# Patient Record
Sex: FEMALE | Race: WHITE | Employment: FULL TIME | URBAN - METROPOLITAN AREA
[De-identification: names, ages, dates, MRNs, and addresses within clinical notes are randomized per-mention and may not be internally consistent; named-entity substitution may affect disease eponyms.]

---

## 2018-09-21 PROBLEM — D25.2 SUBSEROUS LEIOMYOMA OF UTERUS: Chronic | Status: ACTIVE | Noted: 2018-09-21

## 2020-01-24 ENCOUNTER — HOSPITAL ENCOUNTER (OUTPATIENT)
Dept: PHYSICAL THERAPY | Age: 30
End: 2020-01-24

## 2020-01-31 ENCOUNTER — HOSPITAL ENCOUNTER (OUTPATIENT)
Dept: PHYSICAL THERAPY | Age: 30
Discharge: HOME | End: 2020-01-31
Payer: COMMERCIAL

## 2020-01-31 PROCEDURE — 92522 EVALUATE SPEECH PRODUCTION: CPT

## 2020-01-31 PROCEDURE — 92507 TX SP LANG VOICE COMM INDIV: CPT

## 2020-01-31 NOTE — PROGRESS NOTES
[facility]  [address] [facility], [address] [facility] [address]  Ph [phone]  Fx [phone]      Plan of Care/ Statement of Necessity for Speech Therapy Services    Patient name: Javier Rodríguez Start of Care: 2020   Referral source: Samuel Cortez MD : 1990    Medical Diagnosis: Hoarseness [R49.0]   Onset Date:2019    Treatment Diagnosis: Dysphonia [R49.0]   Prior Hospitalization: see medical history Provider#: [id number]   Medications: Verified on Patient summary List    Comorbidities: GERD, Left Tonsillar Hypertrophy   Prior Level of Function: Normal vocal quality prior to GERD diagnosis       The Plan of Care and following information is based on the information from the initial evaluation. Assessment/ key information:   Pt is a 79-year-old female who was referred for an OP speech and language evaluation due to changes in vocal quality. Pt began experiencing symptoms of GERD in . Early in the diagnosis, Pt was prescribed a PPI, but reports no reduction in symptoms. Currently, GERD is not controlled by medication, however, Pt has made lifestyle changes to include elimination of acid-causing foods and caffeine, and waiting 3-hours after eating to lie down. Pt's current symptoms include pain in throat with speech, mild hoarseness, especially with excessive talking, strain and tightness in the laryngeal area, and occasional mucous build up in throat. She works as an  and often engages in meetings with clients and presentations with colleagues; She reports some impact on job due to pain and tightness with speech. No difficulty swallowing reported.     Perceptual assessment:  Consensus Auditory Perceptual Evaluation of Voice (CAPE-V) was administered by this SLP :On a scale of mild to severe, attained the following scores: (see attached in paper chart):  Overall Severity: Consistent; Mildly deviant, Roughness: Inconsistent; Mildly-deviant; Breathiness: Inconsistent; Mildly-deviant, Strain: Consistent; Mild-to-moderately deviant, Pitch (reduced pitch range when singing); Consistent; Mildly deviant, Loudness: No impact; Hoarseness; Inconsistent; Mild-to-Moderately deviant. Patient completed Voice Handicap Index (VHI): (see attached in her paper chart):Zero indicates 'never handicapping' and 4 indicating 'always handicapping'. The higher the score the more handicapping. There are 40 points possible for each of the 3 sections and also a total handicapping score of 120 points. For functional she attained a score of 0 points; for physical she scored 7 points; and for emotional she scored 3 points. She attained a total score of 10 points (Minimal handicap). She indicated that she notices worsening vocal quality at the end of the day, which leads to increased strain for voicing. Pt max phonation time was 18-seconds with an average of 17-seconds. She was able to sustain phonation with an average of 77-dB. S/Z ration was found to be 1.08, which is indicative of positive vocal fold vibration during phonation. Clavicular breathing observed during max phonation tasks and s/z ration task, however, abdominal breathing observed during connected speech. She reported feeling out of breath during connected speech (oral reading. It is recommended that Pt receive skilled voice therapy at a therapy schedule of 1x weekly for 1 month. 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 Pt will benefit from exercises to reduce laryngeal tension for improved vocal quality and ease of phonation. A range of exercises will be trialed at the next session to determine the best course of action.     Problem List:      []aphasic  []dysarthric  []dysphagic       []alexic  []agraphic  [x]dysphonia       []dysfluency   []Cognitive-Linguistic Disorder       []other   Treatment Plan may include any combination of the following: Voice Treatment and Patient Education      Patient / Family readiness to learn indicated by: asking questions and interest    Persons(s) to be included in education:   patient (P)    Barriers to Learning/Limitations: None  Measures Taken If Barriers to Learning Indicated: N/A    Patient Goal (s): Determine if throat pain is from using voice wrong or if it's just from stomach acid    Patient Self Reported Health Status: good    Rehabilitation Potential: good    Short Term Goals: To be accomplished in 5 weeks (Goals to be updated after trial)  1) Pt will participate in trial of vocal exercise programs for Muscle Tension Dysphonia to establish the best coarse of action within 1 session. Long Term Goals: To be accomplished in 8 weeks   1) Pt will demonstrate improved vocal quality, respiratory/phonatory control, and intensity at conversational level to improve daily voice for interactions with clients and colleagues. Frequency / Duration: Patient to be seen 1 times per week for 5 weeks:  Certification Period: N/A  Patient/ Caregiver education and instruction: Diagnosis, prognosis, vocal hygeine, muscle tension dysphonia education    Thank you for this referral!    Babs Mcwilliams SLP 1/31/2020 10:10 AM  ________________________________________________________________________    I certify that the above Therapy Services are being furnished while the patient is under my care. I agree with the treatment plan and certify that this therapy is necessary.     Physician's Signature:____________Date:_________TIME:________    [employer], Date and Time must be completed for valid certification **    Please sign and return to [facility]  [address] Balbir Da Silva, [facility]  Ph [phone]  Fx [phone]       Thank you

## 2020-01-31 NOTE — PROGRESS NOTES
[address] [facility] EVALUATION    Patient Name: Art Mccann  ULCP:  : 1990  [x]  Patient  Verified  Payor: Jose Alberto Parks / Plan: [employer] [place] [employer] / Product Type: HMO /   In time:11:45  Out time:12:20  Total Treatment Time (min): 35  Visit #: 1 of 5    SUBJECTIVE  Pain Level (0-10 scale): 0  Any medication changes, allergies to medications, adverse drug reactions, diagnosis change, or new procedure performed?: [x] No    [] Yes (see summary sheet for update)  Subjective functional status/changes:   [] No changes reported  Determine if throat pain is from using voice wrong or if it's just from stomach acid  Date of Onset: 2019  Social History: Works as an   Prior Functional level: Normal vocal quality  Radiology: N/A  OBJECTIVE    OUTPATIENT VOICE EVALUATION    Description of Problem: Hoarseness, throat pain when speaking, tightness in laryngeal area    Onset of Problem: 2019  Variability through Day: Becomes worse in the evening  Voice Usage: Moderate (on job with clients and colleagues)    Know Abuse/Misuse: Throat clearing    Pitch:   [x] WNL  [] Low  [] High     Comments:  Loudness: [x] WNL  [] Excessive [] Inadequate     Comments:  Quality:  [] WNL      Comments: Mild hoarseness  Resonance: [x] WNL  [] Hypernasal [] Hyponasal     Comments:  Rate:  [x] WNL  [] Fast  [] Slow     Comments:  Range:  [x] WNL  [] [place] [] Excessive variability    Comments:  Observations [] Pitch Breaks [x] Glottal Love [] Stridency [] Hard Glottal Attacks    [] Aphonia [] Diplophonia [] Phonation Breaks     [] Severe Fluctuations on Quality    []Other:     [] Aphonia interspersed with intermittent phonation      Vowel prolongation /a/ (a measure of respiratory/phonatory efficiency):  Duration: 18 seconds max phonation time, Intensity:  77 dB   Vocal quality: Mild hoarseness                S/Z Ratio: S=14, Z=13   1.08    Is breathing audible?   [] Yes [x] No  Is phonation on inhalation? [] Yes [x] No  Is breathing pattern irregular? [] Yes [x] No  Does patient have difficulty sustaining expiration? [] Yes [x] No  Does patient focus on breathing? [] Yes [x] No  Clavicular breathing? [x] Yes [] No  Reduced respiratory/speech coordination? [x] Yes [] No    Frequent coughing? [x] Yes [] No   Frequent throat clearing? [] Yes [x] No   Extrinsic laryngeal tension? [x] Yes [] No  Visible tension present: [x]neck  [] chest  [] mandible  Evidence of tight throat during phonation? [x] Yes [] No  Complaints of tired throat? [x] Yes [] No  Tone focus: []retracted tongue  [] closed mouth   []appropriate [x] back throat focus    Perceptual assessment:  Consensus Auditory Perceptual Evaluation of Voice (CAPE-V) was administered by this SLP :On a scale of mild to severe, attained the following scores: (see attached in paper chart):  Overall Severity: Consistent; Mildly deviant, Roughness: Inconsistent; Mildly-deviant; Breathiness: Inconsistent; Mildly-deviant, Strain: Consistent; Mild-to-moderately deviant, Pitch (reduced pitch range when singing); Consistent; Mildly deviant, Loudness: No impact; Hoarseness; Inconsistent; Mild-to-Moderately deviant.      Patient completed Voice Handicap Index (VHI): (see attached in her paper chart):Zero indicates 'never handicapping' and 4 indicating 'always handicapping'. The higher the score the more handicapping. There are 40 points possible for each of the 3 sections and also a total handicapping score of 120 points. For functional she attained a score of 0 points; for physical she scored 7 points; and for emotional she scored 3 points. She attained a total score of 10 points (Minimal handicap). She indicated that she notices worsening vocal quality at the end of the day, which leads to increased strain for voicing.     Pt max phonation time was 18-seconds with an average of 17-seconds. She was able to sustain phonation with an average of 77-dB.  S/Z ration was found to be 1.08, which is indicative of positive vocal fold vibration during phonation. Clavicular breathing observed during max phonation tasks and s/z ration task, however, abdominal breathing observed during connected speech. She reported feeling out of breath during connected speech (oral reading. It is recommended that Pt receive skilled voice therapy at a therapy schedule of 1x weekly for 1 month. 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 Pt will benefit from exercises to reduce laryngeal tension for improved vocal quality and ease of phonation. A range of exercises will be trialed at the next session to determine the best course of action. Speech:   Oral Verbal Apraxia: [x] None     [] Mild [] Moderate [] Severe   Dysarthria:  [x] None     [] Mild [] Moderate [] Severe   Intelligibility  [x] WNL      [] Words [] Phrases [] Sentences       [] Conversation  % intelligible:   Agrammatic:  [] Yes       [x] No  Hearing screened by:    Passed [] Yes [] No  Comments: Not assessed  Fluency:  [x]WNL  []Dysfluent   Comments:  Motor Speech:  [x]Articulation [facility]    []Apraxia  []oral []verbal  ( []min []mod []severe)    []Dysarthria    Intelligibility:  Deviations noted:  [x]none  []yes, describe:   Auditory Comprehension: [x] WFL [] Impaired - describe: (subjective)  Verbal Expression: [x] WFL [] Impaired - describe: (subjective)  Reading comprehension: [x] WFL [] Impaired - describe: (subjective)  Cognitive skills: [x] WFL [] Impaired - describe: (subjective)      Patient/Caregiver instruction/education: [x] Review HEP    [] Progressed/Changed    HEP/Handouts given: Vocal hygiene handout to be reviewed and implemented     Pain Level (0-10 scale) post treatment: 0    ASSESSMENT  [x]  See Plan of Care     PLAN  []  Upgrade activities as tolerated     [x]  Continue plan of care  []  Discharge due to:__  [] Other:__    Verónica Quesada IAN Tracy 1/31/2020  10:16 AM

## 2020-02-14 ENCOUNTER — HOSPITAL ENCOUNTER (OUTPATIENT)
Dept: PHYSICAL THERAPY | Age: 30
Discharge: HOME | End: 2020-02-14
Payer: COMMERCIAL

## 2020-02-14 PROCEDURE — 92507 TX SP LANG VOICE COMM INDIV: CPT

## 2020-02-14 NOTE — PROGRESS NOTES
ST DAILY TREATMENT NOTE    Patient Name: Olga Lidia Hernandez  JEBA:  : 1990  [x]  Patient  Verified  Payor: Payor: Ramirez Medico / Plan: [address] PT / Product Type: Commerical /   In time:12:30  Out time:1:15  Total Treatment Time (min): 45  Visit #: 2 of 5    Treatment Diagnosis: Dysphonia [R49.0]    SUBJECTIVE  Pain Level (0-10 scale): 4  Any medication changes, allergies to medications, adverse drug reactions, diagnosis change, or new procedure performed?: [x] No    [] Yes (see summary sheet for update)    Subjective functional status/changes:   [x] No changes reported  \"Still some pain on the right side of my throat when I speak, but less hoarseness. \"    OBJECTIVE  Treatment provided includes:  Increase/Improve:  [x]  Voice Quality []  Cognitive Linguistic Skills []  Laryngeal/Pharyngeal Exercises   []  Vocal Loudness []  Reading Comprehension []  Swallowing Skills    [x]  Vocal Cord Function []  Auditory Comprehension []  Oral Motor Skills   []  Resonance []  Writing Skills []  Compensatory strategies    []  Speech Intelligibility []  Expressive Language []  Attention   []  Breath Support/Coord. []  Receptive language []  Memory   []  Articulation []  Safety Awareness []    []  Fluency []  Word Retrieval []        Treatment Provided:  Vocal Function Exercises introduced this date:    Pt sustained \"ee\" with an average of 14 seconds x5    Stretch exercise performed with no voice breaks x5    Contraction exercises performed with no voice breaks x5    Power exercises:  C4: 18s  D4: 13s  E4:14s  F4:15s  G412s    SOVT introduced and performed without difficulty in water    Patient/Caregiver  Education: [x] Review HEP      HEP/Handouts given: Continue HEP as directed by SLP;  Incorporate VFEs x2 daily    Pain Level (0-10 scale) post treatment: 0    ASSESSMENT   []   Improving appropriately and progressing toward goals  [x]   Improving slowly and progressing toward goals  []   Approximating goals/maximum potential  [x]   Continues to benefit from skilled therapy to address remaining functional deficits  []   Not progressing toward goals and plan of care will be adjusted    Patient will continue to benefit from skilled therapy to address remaining functional deficits: Muscle Tension Dysphonia    Progress towards goals / Updated goals:  Pt introduced to  Vocal Function Exercises this date Min cues for lip position during tasks. Laryngeal massage performed this date and specific instruction provided so Pt can perform Rayray in the home. She demonstrated understanding. PLAN  [x]  Continue plan of care  []  Modify Goals/Treatment Plan      []  Discharge due to:  [] Other:    Short Term Goals: To be accomplished in 5 weeks (Goals to be updated after trial)  1) Pt will participate in trial of vocal exercise programs for Muscle Tension Dysphonia to establish the best coarse of action within 1 session. ADDED 2/14/2020 2) Based on Vocal Function Exercises, Pt will sustain \"ee\" on note F4 for at least 10-seconds per trial in order to improve vocal fold function during phonation. ADDED 2/14/2020 3) Based on Vocal Function Exercises, Pt will glide from lowest pitch to highest pitch on word \"knoll\" with no more than 2 voice breaks in order to encourage vocal fold stretching and movement for phonation. ADDED 2/14/2020 4) Based on Vocal Function Exercises, Pt will glide from highest pitch to lowest pitch on word \"knoll\" with no more than 2 voice breaks in order to encourage vocal fold stretching and movement for phonation. ADDED 2/14/2020 5) Based on Vocal Function Exercises,  Pt will sustain note \"oll\" on notes C4, D4, E4,F4, G4 for at least 10-seconds per note in order to improve strength of muscles surrounding vocal folds for phonation.     Gume Sanchez, SLP 2/14/2020  11:19 AM    Future Appointments   Date Time Provider Marissa Renner   2/14/2020 12:30 PM Renee Sandhu, SLP [facility] 2/21/2020  1:15 PM IAN [facility]   2/28/2020 11:45 AM IAN [facility]   3/27/2020  9:00 AM Rashid Harvey, MIKE [facility]

## 2020-02-21 ENCOUNTER — HOSPITAL ENCOUNTER (OUTPATIENT)
Dept: PHYSICAL THERAPY | Age: 30
Discharge: HOME | End: 2020-02-21
Payer: COMMERCIAL

## 2020-02-21 PROCEDURE — 92507 TX SP LANG VOICE COMM INDIV: CPT

## 2020-02-21 NOTE — PROGRESS NOTES
ST DAILY TREATMENT NOTE    Patient Name: Ac Willis  Date:2020  : 1990  [x]  Patient  Verified  Payor: Payor: Jose Bettencourt / Plan: [address] PT / Product Type: Commerical /   In time:1:15  Out time:1:45  Total Treatment Time (min): 45  Visit #: 3 of 5    Treatment Diagnosis: Dysphonia [R49.0]    SUBJECTIVE  Pain Level (0-10 scale): 0  Any medication changes, allergies to medications, adverse drug reactions, diagnosis change, or new procedure performed?: [x] No    [] Yes (see summary sheet for update)    Subjective functional status/changes:   [] No changes reported  \"I was able to talk for my entire meeting yesterday that was 3 hours, although my throat was a little sore after. \"    OBJECTIVE  Treatment provided includes:  Increase/Improve:  [x]  Voice Quality []  Cognitive Linguistic Skills []  Laryngeal/Pharyngeal Exercises   []  Vocal Loudness []  Reading Comprehension []  Swallowing Skills    [x]  Vocal Cord Function []  Auditory Comprehension []  Oral Motor Skills   []  Resonance []  Writing Skills []  Compensatory strategies    []  Speech Intelligibility []  Expressive Language []  Attention   []  Breath Support/Coord. []  Receptive language []  Memory   []  Articulation []  Safety Awareness []    []  Fluency []  Word Retrieval []        Treatment Provided:  Vocal Function Exercises introduced this date:     Pt sustained \"ee\" with an average of 13 seconds x2     Stretch exercise performed with no voice breaks x2     Contraction exercises performed with no voice breaks x2     Power exercises:  C4: 16s  D4: 16s  E4:16s  F4:14s  G4:16s     SOVT introduced and performed without difficulty in water    Patient/Caregiver  Education: [x] Review HEP      HEP/Handouts given: Continue HEP as directed by SLP;  Continue vocal exercises    Pain Level (0-10 scale) post treatment: 0    ASSESSMENT   [x]   Improving appropriately and progressing toward goals  []   Improving slowly and progressing toward goals  []   Approximating goals/maximum potential  [x]   Continues to benefit from skilled therapy to address remaining functional deficits  []   Not progressing toward goals and plan of care will be adjusted    Patient will continue to benefit from skilled therapy to address remaining functional deficits: Muscle Tension Dysphonia     Progress towards goals / Updated goals:  Pt presented with increased duration of sustained notes during adductory strengthening exercise of VFE. She responded well to visual cues for exaggerated lip rounding, which relieved laryngeal tension per Pt's report. PLAN  [x]  Continue plan of care  []  Modify Goals/Treatment Plan      []  Discharge due to:  [] Other:    Short Term Goals: To be accomplished in 5 weeks (Goals to be updated after trial)  1) Pt will participate in trial of vocal exercise programs for Muscle Tension Dysphonia to establish the best coarse of action within 1 session. ADDED 2/14/2020 2) Based on Vocal Function Exercises, Pt will sustain \"ee\" on note F4 for at least 10-seconds per trial in order to improve vocal fold function during phonation. 2/21/2020: Pt sustained \"ee\" with an average of 13 seconds x2  ADDED 2/14/2020 3) Based on Vocal Function Exercises, Pt will glide from lowest pitch to highest pitch on word \"knoll\" with no more than 2 voice breaks in order to encourage vocal fold stretching and movement for phonation. . 2/21/2020: no breaks  ADDED 2/14/2020 4) Based on Vocal Function Exercises, Pt will glide from highest pitch to lowest pitch on word \"knoll\" with no more than 2 voice breaks in order to encourage vocal fold stretching and movement for phonation. 2/21/2020: no breaks  ADDED 2/14/2020 5) Based on Vocal Function Exercises,  Pt will sustain note \"oll\" on notes C4, D4, E4,F4, G4 for at least 10-seconds per note in order to improve strength of muscles surrounding vocal folds for phonation.  2/21/2020: Power exercises:  C4: 16s  D4: 16s  E4:16s  F4:14s  G4:16s    IAN Dougherty 2/21/2020  1:10 PM    Future Appointments   Date Time Provider Marissa Jud   2/21/2020  1:15 PM [facility]   [facility]2020 [facility]   3/6/2020 12:30 [facility]   3/27/2020  9:00 AM Matilde Rush NP 5 [facility]

## 2020-02-28 ENCOUNTER — HOSPITAL ENCOUNTER (OUTPATIENT)
Dept: PHYSICAL THERAPY | Age: 30
Discharge: HOME | End: 2020-02-28
Payer: COMMERCIAL

## 2020-02-28 PROCEDURE — 92507 TX SP LANG VOICE COMM INDIV: CPT

## 2020-02-28 NOTE — PROGRESS NOTES
ST DAILY TREATMENT NOTE    Patient Name: Heraclio Members  Date:2020  : 1990  [x]  Patient  Verified  Payor: Payor: Marley Gary / Plan: [employer] [facility] / Product Type: Commerical /   In time:11:45  Out time:12:20  Total Treatment Time (min): 35  Visit #: 1 of 3    Treatment Diagnosis: Dysphonia [R49.0]    SUBJECTIVE  Pain Level (0-10 scale): 0  Any medication changes, allergies to medications, adverse drug reactions, diagnosis change, or new procedure performed?: [x] No    [] Yes (see summary sheet for update)    Subjective functional status/changes:   [] No changes reported  \"I'm having less hoarseness and pain when speaking. \"    OBJECTIVE  Treatment provided includes:  Increase/Improve:  [x]  Voice Quality []  Cognitive Linguistic Skills []  Laryngeal/Pharyngeal Exercises   []  Vocal Loudness []  Reading Comprehension []  Swallowing Skills    [x]  Vocal Cord Function []  Auditory Comprehension []  Oral Motor Skills   []  Resonance []  Writing Skills []  Compensatory strategies    []  Speech Intelligibility []  Expressive Language []  Attention   []  Breath Support/Coord.  []  Receptive language []  Memory   []  Articulation []  Safety Awareness []    []  Fluency []  Word Retrieval []        Treatment Provided:  Pt sustained \"ee\" with an average of 19 seconds x2     Stretch exercise performed with no voice breaks x2     Contraction exercises performed with no voice breaks x2     Power exercises:  C4: 21s  D4: 19s  E4:22s  F4:24s  G4:20s    Patient/Caregiver  Education: [x] Review HEP      HEP/Handouts given: Continue HEP as directed by SLP; continue exercises as needed; avoid if laryngeal pain persists    Pain Level (0-10 scale) post treatment: 0    ASSESSMENT   [x]   Improving appropriately and progressing toward goals  []   Improving slowly and progressing toward goals  []   Approximating goals/maximum potential  [x]   Continues to benefit from skilled therapy to address remaining functional deficits  []   Not progressing toward goals and plan of care will be adjusted    Patient will continue to benefit from skilled therapy to address remaining functional deficits: Muscle Tension Dysphonia    Progress towards goals / Updated goals:  Pt has made marked progress towards all goals. She presents with decreasing hoarseness during conversational speech, as well as improved phonation times during structured tasks. She continues to report mild tension/pain in submental area during certain portion of exercises. Laryngeal massage continues to be helpful for specific muscle group. We will continue to work for 3-weeks to reduce laryngeal pain/tension and maintain current progress. PLAN  [x]  Continue plan of care  []  Modify Goals/Treatment Plan      []  Discharge due to:  [] Other:    Goals: to be achieved in 3 weeks:   1) Based on techniques from Vocal Function Exercises, Pt will perform all 4 exercises with pain rating score of 2 or less (scale of 1-5). 2) Pt will tolerate laryngeal massage for at least 30 seconds in order to reduce laryngeal tension during phonation.     IAN Simpson 2/28/2020  10:05 AM    Future Appointments   Date Time Provider Marissa Renner   2/28/2020 11:45 AM IAN [facility]   3/6/2020 12:30 PM IAN [facility]   3/27/2020  9:00 AM Jody Cowan, MIKE [address] [facility]

## 2020-02-28 NOTE — PROGRESS NOTES
[facility]  [address], [address] [facility]  Ph [phone]  Fx [phone]      Speech Therapy Physician Update  [x] Progress Note  [] Discharge Summary  Patient name: Valentino Hover Start of Care: 2020   Referral source: Deena Da Silva MD : 1990   Medical/Treatment Diagnosis: Hoarseness [R49.0] Onset Date:2019     Prior Hospitalization: see medical history Provider#: [id number]   Medications: Verified on Patient Summary List    Comorbidities: GERD, Left Tonsillar Hypertrophy  Prior Level of Function: Normal vocal quality prior to GERD diagnosis                                 Visits from Start of Care: 3    Missed Visits: 1    Progress towards Goals:     1) Pt will participate in trial of vocal exercise programs for Muscle Tension Dysphonia to establish the best coarse of action within 1 session. GOAL MET    ADDED 2020 2) Based on Vocal Function Exercises, Pt will sustain \"ee\" on note F4 for at least 10-seconds per trial in order to improve vocal fold function during phonation. Status at start of goal: Pt sustained \"ee\" with an average of 14 seconds   Current Status: GOAL MET; Sustained \"ee\" with an average of 19 seconds     ADDED 2020 3) Based on Vocal Function Exercises, Pt will glide from lowest pitch to highest pitch on word \"knoll\" with no more than 2 voice breaks in order to encourage vocal fold stretching and movement for phonation. Status at start of goal: Stretch exercise performed with no voice breaks x  Current Status: GOAL MET; No voice breaks during stretch task    ADDED 2020 4) Based on Vocal Function Exercises, Pt will glide from highest pitch to lowest pitch on word \"knoll\" with no more than 2 voice breaks in order to encourage vocal fold stretching and movement for phonation.    Status at start of goal: Contraction exercises performed with no voice breaks x  Current Status: GOAL MET; No voice breaks during contraction task    ADDED 2/14/2020 5) Based on Vocal Function Exercises,  Pt will sustain note \"oll\" on notes C4, D4, E4,F4, G4 for at least 10-seconds per note in order to improve strength of muscles surrounding vocal folds for phonation. Status at start of goal:   C4: 18s  D4: 13s  E4:14s  F4:15s  G4: 12s  Current Status: GOAL MET  C4: 21s  D4: 19s  E4:22s  F4:24s  G4:20s     Goals: to be achieved in 3 weeks:   1) Based on techniques from Vocal Function Exercises, Pt will perform all 4 exercises with pain rating score of 2 or less (scale of 1-5). 2) Pt will tolerate laryngeal massage for at least 30 seconds in order to reduce laryngeal tension during phonation. ASSESSMENT:   Pt has made marked progress towards all goals. She presents with decreasing hoarseness during conversational speech, as well as improved phonation times during structured tasks. She continues to report mild tension/pain in submental area during certain portion of exercises. Laryngeal massage continues to be helpful for specific muscle group. We will continue to work for 3-weeks to reduce laryngeal pain/tension and maintain current progress.     ASSESSMENT/RECOMMENDATIONS:  [x]Continue therapy per initial plan/protocol at a frequency of  1 x per week for 3 weeks  []Continue therapy with the following recommended changes:_____________________      _____________________________________________________________________  []Discontinue therapy progressing towards or have reached established goals  []Discontinue therapy due to lack of appreciable progress towards goals  []Discontinue therapy due to lack of attendance or compliance  []Await Physician's recommendations/decisions regarding therapy  []Other:________________________________________________________________    Thank you for this referral.   IAN Velásquez 2/28/2020 12:22 PM    NOTE TO PHYSICIAN:  PLEASE COMPLETE THE ORDERS BELOW AND   FAX TO [facility]: [phone]  If you are unable to process this request in 24 hours please contact our office: [phone]    []  I have read the above report and request that my patient continue as recommended. []  I have read the above report and request that my patient continue therapy with the following changes/special instructions:________________________________________  []I have read the above report and request that my patient be discharged from therapy.     [de-identified] Signature:____________Date:_________TIME:________    [employer], Date and Time must be completed for valid certification **

## 2020-03-13 ENCOUNTER — APPOINTMENT (OUTPATIENT)
Dept: PHYSICAL THERAPY | Age: 30
End: 2020-03-13

## 2020-03-26 NOTE — PROGRESS NOTES
[facility]  [address] [place], [address] [facility]  Ph [phone]  Fx [phone]      Speech Therapy Physician Update  [x] Progress Note  [] Discharge Summary  Patient name: Pierce Botello Start of Care: 2020   Referral source: Dulce Maria Echevarria MD : 1990   Medical/Treatment Diagnosis: Hoarseness [R49.0] Onset Date:2019     Prior Hospitalization: see medical history Provider#: [id number]   Medications: Verified on Patient Summary List    Comorbidities: GERD, Left Tonsillar Hypertrophy  Prior Level of Function: Normal vocal quality prior to GERD diagnosis                              Visits from Start of Care: 4    Missed Visits: 2    Status at Evaluation/Last Progress Note: Pt has made marked progress towards all goals. She presents with decreasing hoarseness during conversational speech, as well as improved phonation times during structured tasks. She continues to report mild tension/pain in submental area during certain portion of exercises. Laryngeal massage continues to be helpful for specific muscle group. We will continue to work to reduce laryngeal pain/tension and maintain current progress. Progress towards Goals:     1) Based on techniques from Vocal Function Exercises, Pt will perform all 4 exercises with pain rating score of 2 or less (scale of 1-5). Not yet addressed; Unable to formally assess at this time    2) Pt will tolerate laryngeal massage for at least 30 seconds in order to reduce laryngeal tension during phonation. Not yet addressed; Unable to formally assess at this time    ASSESSMENT: Pt has been making good progress towards goals however is to be placed on hold at this time due public health concerns for [address].  Pt has been given an updated HEP with option of SLP sending out an updated HEP via e-mail if needed to stay in communication with therapist. If pt's symptoms return and are unable to be managed with exercises at home pt educated to follow-up with therapist to be taken off hold as appropriate. Patient care will resume to previous established frequency when public health crisis has subsided. ASSESSMENT/RECOMMENDATIONS:  []Continue therapy per initial plan/protocol at a frequency of   x per week for  weeks  []Continue therapy with the following recommended changes:_____________________      _____________________________________________________________________  []Discontinue therapy progressing towards or have reached established goals  []Discontinue therapy due to lack of appreciable progress towards goals  []Discontinue therapy due to lack of attendance or compliance  []Await Physician's recommendations/decisions regarding therapy  []Other:________________________________________________________________    Thank you for this referral.   IAN Price 3/26/2020 1:33 PM    NOTE TO PHYSICIAN:  [address] AND   FAX TO [facility]: [phone]  If you are unable to process this request in 24 hours please contact our office: [phone]    []  I have read the above report and request that my patient continue as recommended. []  I have read the above report and request that my patient continue therapy with the following changes/special instructions:________________________________________  []I have read the above report and request that my patient be discharged from therapy.     [de-identified] Signature:____________Date:_________TIME:________    [employer], Date and Time must be completed for valid certification **

## 2020-05-07 NOTE — PROGRESS NOTES
[facility]  [address] [facility]  [facility], [address]  Ph [phone]  Fx [phone]    Speech Therapy Discharge Summary    Patient name: Mauricia Bumpers Start of Care: 2020   Referral source: Mark Shcroeder MD : 1990   Medical/Treatment Diagnosis: Hoarseness [R49.0] Onset Date:2019     Prior Hospitalization: see medical history Provider#: [id number]   Medications: Verified on Patient Summary List    Comorbidities: GERD, Left Tonsillar Hypertrophy  Prior Level of Function: Normal vocal quality prior to GERD diagnosis                                   Visits from Start of Care: 4    Missed Visits: 1    Reporting Period : 20 to 20    Summary of Care:  Goal: Based on techniques from Vocal Function Exercises, Pt will perform all 4 exercises with pain rating score of 2 or less (scale of 1-5). Status at last note/certification:  Sustained \"ee\" with an average of 19 seconds   No voice breaks during stretch task   No voice breaks during contraction task  C4: 21s  D4: 19s  E4:22s  F4:24s  G4:20s  Status at discharge: GOAL MET;  Pt sustained \"ee\" with an average of 19 seconds x2  Stretch exercise performed with no voice breaks x2  Contraction exercises performed with no voice breaks x2  Power exercises:  C4: 21s  D4: 19s  E4:22s  F4:24s  G4:20s    Goal: Pt will tolerate laryngeal massage for at least 30 seconds in order to reduce laryngeal tension during phonation  Goal was not initiated as Pt's last session was the date of most recent progress report    ASSESSMENT: Pt made marked progress towards all goals. Overall improvement observed in vocal quality during conversation and respiratory support for speech. Pt shared that she feels her voice is much clearer and laryngeal pain has reduced during phonation.     RECOMMENDATIONS:  [x]Discontinue therapy: [x]Patient has reached or is progressing toward set goals      []Patient is non-compliant or has abdicated      []Due to lack of appreciable progress towards set goals     Thank you for this referral.    IAN Elkins 5/7/2020 1:17 PM

## 2021-06-28 ENCOUNTER — HOSPITAL ENCOUNTER (OUTPATIENT)
Age: 31
Setting detail: OUTPATIENT SURGERY
Discharge: HOME | End: 2021-06-28
Attending: INTERNAL MEDICINE | Admitting: INTERNAL MEDICINE
Payer: COMMERCIAL

## 2021-06-28 VITALS
HEART RATE: 94 BPM | WEIGHT: 129.6 LBS | BODY MASS INDEX: 20.83 KG/M2 | SYSTOLIC BLOOD PRESSURE: 122 MMHG | RESPIRATION RATE: 18 BRPM | TEMPERATURE: 98 F | HEIGHT: 66 IN | OXYGEN SATURATION: 98 % | DIASTOLIC BLOOD PRESSURE: 67 MMHG

## 2021-06-28 LAB — HCG UR QL: NEGATIVE

## 2021-06-28 PROCEDURE — 81025 URINE PREGNANCY TEST: CPT

## 2021-06-28 PROCEDURE — 2709999900 HC NON-CHARGEABLE SUPPLY: Performed by: INTERNAL MEDICINE

## 2021-06-28 PROCEDURE — 77030040361 HC SLV COMPR DVT MDII -B: Performed by: INTERNAL MEDICINE

## 2021-06-28 PROCEDURE — 74011250636 HC RX REV CODE- 250/636: Performed by: INTERNAL MEDICINE

## 2021-06-28 PROCEDURE — 77030039961 HC KT CUST COLON BSC -D: Performed by: INTERNAL MEDICINE

## 2021-06-28 PROCEDURE — 76040000007: Performed by: INTERNAL MEDICINE

## 2021-06-28 PROCEDURE — G0500 MOD SEDAT ENDO SERVICE >5YRS: HCPCS | Performed by: INTERNAL MEDICINE

## 2021-06-28 RX ORDER — DEXTROMETHORPHAN/PSEUDOEPHED 2.5-7.5/.8
1.2 DROPS ORAL
Status: CANCELLED | OUTPATIENT
Start: 2021-06-28

## 2021-06-28 RX ORDER — FENTANYL CITRATE 50 UG/ML
100 INJECTION, SOLUTION INTRAMUSCULAR; INTRAVENOUS
Status: DISCONTINUED | OUTPATIENT
Start: 2021-06-28 | End: 2021-06-28 | Stop reason: HOSPADM

## 2021-06-28 RX ORDER — EPINEPHRINE 0.1 MG/ML
1 INJECTION INTRACARDIAC; INTRAVENOUS
Status: CANCELLED | OUTPATIENT
Start: 2021-06-28 | End: 2021-06-29

## 2021-06-28 RX ORDER — DIPHENHYDRAMINE HYDROCHLORIDE 50 MG/ML
50 INJECTION, SOLUTION INTRAMUSCULAR; INTRAVENOUS ONCE
Status: CANCELLED | OUTPATIENT
Start: 2021-06-28 | End: 2021-06-28

## 2021-06-28 RX ORDER — OMEPRAZOLE 40 MG/1
40 CAPSULE, DELAYED RELEASE ORAL DAILY
COMMUNITY
End: 2021-08-30

## 2021-06-28 RX ORDER — MIDAZOLAM HYDROCHLORIDE 1 MG/ML
.25-5 INJECTION, SOLUTION INTRAMUSCULAR; INTRAVENOUS
Status: DISCONTINUED | OUTPATIENT
Start: 2021-06-28 | End: 2021-06-28 | Stop reason: HOSPADM

## 2021-06-28 RX ORDER — SODIUM CHLORIDE 0.9 % (FLUSH) 0.9 %
5-40 SYRINGE (ML) INJECTION AS NEEDED
Status: CANCELLED | OUTPATIENT
Start: 2021-06-28

## 2021-06-28 RX ORDER — NALOXONE HYDROCHLORIDE 0.4 MG/ML
0.4 INJECTION, SOLUTION INTRAMUSCULAR; INTRAVENOUS; SUBCUTANEOUS
Status: DISCONTINUED | OUTPATIENT
Start: 2021-06-28 | End: 2021-06-28 | Stop reason: HOSPADM

## 2021-06-28 RX ORDER — FLUMAZENIL 0.1 MG/ML
0.2 INJECTION INTRAVENOUS
Status: DISCONTINUED | OUTPATIENT
Start: 2021-06-28 | End: 2021-06-28 | Stop reason: HOSPADM

## 2021-06-28 RX ORDER — SODIUM CHLORIDE 9 MG/ML
1000 INJECTION, SOLUTION INTRAVENOUS CONTINUOUS
Status: CANCELLED | OUTPATIENT
Start: 2021-06-28 | End: 2021-06-28

## 2021-06-28 RX ORDER — SODIUM CHLORIDE 0.9 % (FLUSH) 0.9 %
5-40 SYRINGE (ML) INJECTION EVERY 8 HOURS
Status: CANCELLED | OUTPATIENT
Start: 2021-06-28

## 2021-06-28 RX ORDER — SODIUM CHLORIDE 9 MG/ML
125 INJECTION, SOLUTION INTRAVENOUS CONTINUOUS
Status: DISCONTINUED | OUTPATIENT
Start: 2021-06-28 | End: 2021-06-28 | Stop reason: HOSPADM

## 2021-06-28 RX ORDER — ATROPINE SULFATE 0.1 MG/ML
0.5 INJECTION INTRAVENOUS
Status: CANCELLED | OUTPATIENT
Start: 2021-06-28 | End: 2021-06-29

## 2021-06-28 RX ADMIN — SODIUM CHLORIDE 125 ML/HR: 900 INJECTION, SOLUTION INTRAVENOUS at 12:55

## 2021-06-28 NOTE — H&P
Assessment/Plan  # Detail Type Description    1. Assessment Gastroesophageal reflux disease with esophagitis (K21.0). Patient Plan 27year old female pt of Dr. Allyssa Moreno referred for chronic GERD. Symptoms occur daily and at night including burning in the throat, acid reflux, occasional nausea, occasional regurgitation and bitter taste in mouth. Patient was taking Omeprazole but discontinued due to side effects and was taking Zantac with minimal relief. She sleeps on wedge pillows. Denies any tobacco or ETOH use. Pt has modified diet and eliminated triggers. Has since stopped all caffeine use. Last EGD was 2 years ago with unremarkable exam per pt. PLAN: EGD scheduled. Pepcid & Gaviscon @ night. Follow GERD DIET: Eat low-fat and non-spicy foods. Avoid fried foods, peppermint, chocolate, alcohol, citrus fruits, juices, coffee, and tomato products. Avoid bending at the waist, restrictive clothing and lying down 2-3 hours after meals. If necessary elevate head of bed by 3-4 inches. This 27year old female.               Allergies:  Ingredient Reaction (Severity) Medication Name Comment   NO KNOWN ALLERGIES                Active Patient Care Team Members    Name Contact Agency Type Support Role Relationship Active Date Inactive Date Specialty   Kody Gonsales   Patient provider PCP   Family Practice     Vitals (last day)    Date/Time Temp Pulse BP Cardiac Rhythm Who   06/28/21 1223 98 °F (36.7 °C) 71 134/71 -- AC      Respiratory Therapy (last day)    Date/Time Resp SpO2 O2 Device O2 Flow Rate (L/min) Who   06/28/21 1223 15 100 % None (Room air) -- AC       No change in H&P

## 2021-06-28 NOTE — PERIOP NOTES
Reviewed discharge plan of care with patient and her . Written instructions provided as well. They also spoke with Dr Jayden Davison.

## 2021-06-28 NOTE — PROCEDURES
(EGD) Esophagogastroduodenoscopy (UPPER ENDOSCOPY) Procedure Note  [facility] [place]  __________________________________________________________________________________________________________________________      6/28/2021     Patient: Claude Sinclair YOB: 1990 Gender: female Age: 32 y.o. INDICATION:  27year old female pt of Dr. Marcelle Garcia referred for chronic GERD. Symptoms occur daily and at night including burning in the throat, acid reflux, occasional nausea, occasional regurgitation and bitter taste in mouth. Patient was taking Omeprazole but discontinued due to side effects and was taking Zantac with minimal relief. She sleeps on wedge pillows. Denies any tobacco or ETOH use. Pt has modified diet and eliminated triggers. Has since stopped all caffeine use. For the past 2 months she has been taking Omeprazole 40 mg daily an hour before breakfast but not helping much whee she feels her substernal burning on awakening in the morning with belching. She has rare dysphagia to solids. She has been on Gluten free diet for 5 years. She has some belching and rare nausea but no early satiety or vomiting. Her weight has been stable. s regular bm daily  : Solomon Casper MD    Referring Provider:  Medhat Lee DO    Sedation:  Versed 9 mg IV, Fentanyl 150 mcg IV    Procedure Details:  After infomed consent was obtained for the procedure, with all risks and benefits of procedure explained to the patient. She was taken to the endoscopy suite and placed in the left lateral decubitus position. Following sequential administration of sedation as per above, the endoscope was inserted into the mouth and advanced under direct vision to third portion of the duodenum. A careful inspection was made as the gastroscope was withdrawn, including a retroflexed view of the proximal stomach; findings and interventions are described below.       OROPHARYNX: The vocal Cords and the larynx are normal.   ESOPHAGUS: The proximal, mid, and distal oesophagus are normal. The Z-Line is intact located at 41 cm. No Hiatal hernia. Diaphragmatic opening or notch is located at 41 cm. STOMACH: No evidence of blood, fluid or solid food retention. The fundus on antegrade and retroflex views is normal. The cardia, body, lesser curvature, greater curvature, the antrum, and pylorus are normal. The gastric mucosa is normal.  DUODENUM: The bulb, second, third, fourth portions and major papilla are normal.  PROXIMAL JEJUNUM:  Not examined  Therapies:  Nil    Specimen: none           Complications:   None    EBL:  Nil. IMPLANTS: * No implants in log *  IMPRESSION: Normal upper endoscopy. No evidence of any esophagitis or stenosis. RECOMMENDATION:  May resume antireflux diet. Avoid NSAID's. Make a FU appointment at the office. Start taking the Omeprazole 20 mg daily half an hour before supper as needed. Has to treat breakthrough heartburns with OTC anatcids like Gaviscon. Avoid eating for 3 hours before reclining to bed and lift the head of the bed with a wedge. Recommend to do an UGI series and gastric empty study.     Assistant: None    --Corbin Albrecht MD on 6/28/2021 at 2:20 PM

## 2021-07-30 ENCOUNTER — TRANSCRIBE ORDER (OUTPATIENT)
Dept: SCHEDULING | Age: 31
End: 2021-07-30

## 2021-07-30 DIAGNOSIS — K21.9 GASTRO-ESOPHAGEAL REFLUX DISEASE WITHOUT ESOPHAGITIS: Primary | ICD-10-CM

## 2021-08-30 ENCOUNTER — HOSPITAL ENCOUNTER (OUTPATIENT)
Dept: PREADMISSION TESTING | Age: 31
Discharge: HOME | End: 2021-08-30

## 2021-08-30 VITALS — WEIGHT: 130 LBS | HEIGHT: 66 IN | BODY MASS INDEX: 20.89 KG/M2

## 2021-08-30 RX ORDER — SODIUM CHLORIDE, SODIUM LACTATE, POTASSIUM CHLORIDE, CALCIUM CHLORIDE 600; 310; 30; 20 MG/100ML; MG/100ML; MG/100ML; MG/100ML
125 INJECTION, SOLUTION INTRAVENOUS CONTINUOUS
Status: CANCELLED | OUTPATIENT
Start: 2021-08-30

## 2021-08-30 RX ORDER — CEFAZOLIN SODIUM/WATER 2 G/20 ML
2 SYRINGE (ML) INTRAVENOUS ONCE
Status: CANCELLED | OUTPATIENT
Start: 2021-08-30 | End: 2021-08-30

## 2021-08-30 NOTE — PERIOP NOTES
PAT - SURGICAL PRE-ADMISSION INSTRUCTIONS    NAME:  Feliberto Duggan                                                          TODAY'S DATE:  8/30/2021    SURGERY DATE:  9/10/2021                                  SURGERY ARRIVAL TIME:   TBA    1. Do NOT eat or drink anything, including candy or gum, after MIDNIGHT on 9-10 , unless you have specific instructions from your Surgeon or Anesthesia Provider to do so. 2. No smoking 24 hours before surgery. 3. No alcohol 24 hours prior to the day of surgery. 4. No recreational drugs for one week prior to the day of surgery. 5. Leave all valuables, including money/purse, at home. 6. Remove all jewelry, nail polish, makeup (including mascara); no lotions, powders, deodorant, or perfume/cologne/after shave. 7. Glasses/Contact lenses and Dentures may be worn to the hospital.  They will be removed prior to surgery. 8. Call your doctor if symptoms of a cold or illness develop within 24 ours prior to surgery. 9. AN ADULT MUST DRIVE YOU HOME AFTER OUTPATIENT SURGERY. 10. If you are having an OUTPATIENT procedure, please make arrangements for a responsible adult to be with you for 24 hours after your surgery. 11. If you are admitted to the hospital, you will be assigned to a bed after surgery is complete. Normally a family member will not be able to see you until you are in your assigned bed. 12. Visitation Restrictions Explained. Special Instructions:  Covid Test 9-7, Quarantine requirements discussed  NONE.     Patient Prep:    use CHG solution         These surgical instructions were reviewed with the patient during the PAT phone call    Labs on Sept. 2nd

## 2021-09-03 ENCOUNTER — HOSPITAL ENCOUNTER (OUTPATIENT)
Dept: LAB | Age: 31
Discharge: HOME | End: 2021-09-03

## 2021-09-03 LAB — SENTARA SPECIMEN COL,SENBCF: NORMAL

## 2021-09-03 PROCEDURE — 99001 SPECIMEN HANDLING PT-LAB: CPT

## 2022-09-12 ENCOUNTER — HOSPITAL ENCOUNTER (INPATIENT)
Age: 32
LOS: 1 days | Discharge: HOME | End: 2022-09-14
Attending: OBSTETRICS & GYNECOLOGY | Admitting: OBSTETRICS & GYNECOLOGY
Payer: COMMERCIAL

## 2022-09-12 PROBLEM — O47.9 UTERINE CONTRACTIONS: Status: ACTIVE | Noted: 2022-09-12

## 2022-09-12 PROBLEM — Z3A.40 40 WEEKS GESTATION OF PREGNANCY: Status: ACTIVE | Noted: 2022-09-12

## 2022-09-12 PROBLEM — Z33.1 IUP (INTRAUTERINE PREGNANCY), INCIDENTAL: Status: ACTIVE | Noted: 2022-09-12

## 2022-09-12 LAB
ABO + RH BLD: NORMAL
BASOPHILS # BLD: 0 K/UL (ref 0–0.1)
BASOPHILS NFR BLD: 0 % (ref 0–2)
BLOOD GROUP ANTIBODIES SERPL: NORMAL
DIFFERENTIAL METHOD BLD: ABNORMAL
EOSINOPHIL # BLD: 0.1 K/UL (ref 0–0.4)
EOSINOPHIL NFR BLD: 1 % (ref 0–5)
ERYTHROCYTE [DISTWIDTH] IN BLOOD BY AUTOMATED COUNT: 13.6 % (ref 11.6–14.5)
HCT VFR BLD AUTO: 37.6 % (ref 35–45)
HGB BLD-MCNC: 13.4 G/DL (ref 12–16)
IMM GRANULOCYTES # BLD AUTO: 0 K/UL (ref 0–0.04)
IMM GRANULOCYTES NFR BLD AUTO: 0 % (ref 0–0.5)
LYMPHOCYTES # BLD: 1.2 K/UL (ref 0.9–3.6)
LYMPHOCYTES NFR BLD: 15 % (ref 21–52)
MCH RBC QN AUTO: 33.6 PG (ref 24–34)
MCHC RBC AUTO-ENTMCNC: 35.6 G/DL (ref 31–37)
MCV RBC AUTO: 94.2 FL (ref 78–100)
MONOCYTES # BLD: 0.6 K/UL (ref 0.05–1.2)
MONOCYTES NFR BLD: 7 % (ref 3–10)
NEUTS SEG # BLD: 6.5 K/UL (ref 1.8–8)
NEUTS SEG NFR BLD: 77 % (ref 40–73)
NRBC # BLD: 0 K/UL (ref 0–0.01)
NRBC BLD-RTO: 0 PER 100 WBC
PLATELET # BLD AUTO: 179 K/UL (ref 135–420)
PMV BLD AUTO: 10.7 FL (ref 9.2–11.8)
RBC # BLD AUTO: 3.99 M/UL (ref 4.2–5.3)
SPECIMEN EXP DATE BLD: NORMAL
WBC # BLD AUTO: 8.4 K/UL (ref 4.6–13.2)

## 2022-09-12 PROCEDURE — 59025 FETAL NON-STRESS TEST: CPT

## 2022-09-12 PROCEDURE — 86900 BLOOD TYPING SEROLOGIC ABO: CPT

## 2022-09-12 PROCEDURE — 85025 COMPLETE CBC W/AUTO DIFF WBC: CPT

## 2022-09-12 RX ORDER — LIDOCAINE HYDROCHLORIDE 10 MG/ML
20 INJECTION, SOLUTION EPIDURAL; INFILTRATION; INTRACAUDAL; PERINEURAL AS NEEDED
Status: COMPLETED | OUTPATIENT
Start: 2022-09-12 | End: 2022-09-13

## 2022-09-12 RX ORDER — METHYLERGONOVINE MALEATE 0.2 MG/ML
0.2 INJECTION INTRAVENOUS AS NEEDED
Status: DISCONTINUED | OUTPATIENT
Start: 2022-09-12 | End: 2022-09-13 | Stop reason: HOSPADM

## 2022-09-12 RX ORDER — BUTORPHANOL TARTRATE 2 MG/ML
2 INJECTION INTRAMUSCULAR; INTRAVENOUS
Status: DISCONTINUED | OUTPATIENT
Start: 2022-09-12 | End: 2022-09-13 | Stop reason: HOSPADM

## 2022-09-12 RX ORDER — MINERAL OIL
30 OIL (ML) ORAL AS NEEDED
Status: COMPLETED | OUTPATIENT
Start: 2022-09-12 | End: 2022-09-13

## 2022-09-12 RX ORDER — HYDROMORPHONE HYDROCHLORIDE 1 MG/ML
1 INJECTION, SOLUTION INTRAMUSCULAR; INTRAVENOUS; SUBCUTANEOUS
Status: DISCONTINUED | OUTPATIENT
Start: 2022-09-12 | End: 2022-09-13 | Stop reason: HOSPADM

## 2022-09-12 RX ORDER — OXYTOCIN/0.9 % SODIUM CHLORIDE 30/500 ML
87.3 PLASTIC BAG, INJECTION (ML) INTRAVENOUS AS NEEDED
Status: COMPLETED | OUTPATIENT
Start: 2022-09-12 | End: 2022-09-13

## 2022-09-12 RX ORDER — NALBUPHINE HYDROCHLORIDE 10 MG/ML
10 INJECTION, SOLUTION INTRAMUSCULAR; INTRAVENOUS; SUBCUTANEOUS
Status: DISCONTINUED | OUTPATIENT
Start: 2022-09-12 | End: 2022-09-13 | Stop reason: HOSPADM

## 2022-09-12 RX ORDER — SODIUM CHLORIDE, SODIUM LACTATE, POTASSIUM CHLORIDE, CALCIUM CHLORIDE 600; 310; 30; 20 MG/100ML; MG/100ML; MG/100ML; MG/100ML
125 INJECTION, SOLUTION INTRAVENOUS CONTINUOUS
Status: DISCONTINUED | OUTPATIENT
Start: 2022-09-12 | End: 2022-09-13 | Stop reason: HOSPADM

## 2022-09-12 RX ORDER — TERBUTALINE SULFATE 1 MG/ML
0.25 INJECTION SUBCUTANEOUS
Status: DISCONTINUED | OUTPATIENT
Start: 2022-09-12 | End: 2022-09-13 | Stop reason: HOSPADM

## 2022-09-12 RX ORDER — ONDANSETRON 2 MG/ML
4 INJECTION INTRAMUSCULAR; INTRAVENOUS
Status: DISCONTINUED | OUTPATIENT
Start: 2022-09-12 | End: 2022-09-13 | Stop reason: HOSPADM

## 2022-09-12 RX ORDER — OXYTOCIN/RINGER'S LACTATE 30/500 ML
10 PLASTIC BAG, INJECTION (ML) INTRAVENOUS AS NEEDED
Status: DISCONTINUED | OUTPATIENT
Start: 2022-09-12 | End: 2022-09-13 | Stop reason: HOSPADM

## 2022-09-13 ENCOUNTER — ANESTHESIA (OUTPATIENT)
Dept: LABOR AND DELIVERY | Age: 32
End: 2022-09-13
Payer: COMMERCIAL

## 2022-09-13 ENCOUNTER — ANESTHESIA EVENT (OUTPATIENT)
Dept: LABOR AND DELIVERY | Age: 32
End: 2022-09-13
Payer: COMMERCIAL

## 2022-09-13 PROCEDURE — 74011250637 HC RX REV CODE- 250/637: Performed by: ADVANCED PRACTICE MIDWIFE

## 2022-09-13 PROCEDURE — 65270000029 HC RM PRIVATE

## 2022-09-13 PROCEDURE — 0KQM0ZZ REPAIR PERINEUM MUSCLE, OPEN APPROACH: ICD-10-PCS | Performed by: ADVANCED PRACTICE MIDWIFE

## 2022-09-13 PROCEDURE — 74011000250 HC RX REV CODE- 250: Performed by: NURSE ANESTHETIST, CERTIFIED REGISTERED

## 2022-09-13 PROCEDURE — 74011250636 HC RX REV CODE- 250/636

## 2022-09-13 PROCEDURE — 00HU33Z INSERTION OF INFUSION DEVICE INTO SPINAL CANAL, PERCUTANEOUS APPROACH: ICD-10-PCS | Performed by: NURSE ANESTHETIST, CERTIFIED REGISTERED

## 2022-09-13 PROCEDURE — 74011250636 HC RX REV CODE- 250/636: Performed by: ADVANCED PRACTICE MIDWIFE

## 2022-09-13 PROCEDURE — 74011000258 HC RX REV CODE- 258

## 2022-09-13 PROCEDURE — 76060000078 HC EPIDURAL ANESTHESIA

## 2022-09-13 PROCEDURE — 74011000250 HC RX REV CODE- 250: Performed by: ADVANCED PRACTICE MIDWIFE

## 2022-09-13 PROCEDURE — 74011250637 HC RX REV CODE- 250/637

## 2022-09-13 PROCEDURE — 75410000003 HC RECOV DEL/VAG/CSECN EA 0.5 HR

## 2022-09-13 PROCEDURE — 75410000002 HC LABOR FEE PER 1 HR

## 2022-09-13 PROCEDURE — 75410000000 HC DELIVERY VAGINAL/SINGLE

## 2022-09-13 RX ORDER — IBUPROFEN 400 MG/1
800 TABLET ORAL 3 TIMES DAILY
Status: DISCONTINUED | OUTPATIENT
Start: 2022-09-13 | End: 2022-09-13

## 2022-09-13 RX ORDER — AMOXICILLIN 250 MG
1 CAPSULE ORAL
Status: DISCONTINUED | OUTPATIENT
Start: 2022-09-13 | End: 2022-09-14 | Stop reason: HOSPADM

## 2022-09-13 RX ORDER — PROMETHAZINE HYDROCHLORIDE 25 MG/ML
25 INJECTION, SOLUTION INTRAMUSCULAR; INTRAVENOUS
Status: DISCONTINUED | OUTPATIENT
Start: 2022-09-13 | End: 2022-09-14 | Stop reason: HOSPADM

## 2022-09-13 RX ORDER — IBUPROFEN 400 MG/1
800 TABLET ORAL
Status: DISCONTINUED | OUTPATIENT
Start: 2022-09-13 | End: 2022-09-14 | Stop reason: HOSPADM

## 2022-09-13 RX ORDER — DIPHENHYDRAMINE HYDROCHLORIDE 50 MG/ML
25 INJECTION, SOLUTION INTRAMUSCULAR; INTRAVENOUS
Status: DISCONTINUED | OUTPATIENT
Start: 2022-09-13 | End: 2022-09-13 | Stop reason: HOSPADM

## 2022-09-13 RX ORDER — SODIUM CHLORIDE 0.9 % (FLUSH) 0.9 %
5-40 SYRINGE (ML) INJECTION EVERY 8 HOURS
Status: DISCONTINUED | OUTPATIENT
Start: 2022-09-13 | End: 2022-09-13 | Stop reason: HOSPADM

## 2022-09-13 RX ORDER — LIDOCAINE HYDROCHLORIDE AND EPINEPHRINE 15; 5 MG/ML; UG/ML
INJECTION, SOLUTION EPIDURAL AS NEEDED
Status: DISCONTINUED | OUTPATIENT
Start: 2022-09-13 | End: 2022-09-13 | Stop reason: HOSPADM

## 2022-09-13 RX ORDER — ACETAMINOPHEN 325 MG/1
650 TABLET ORAL
Status: DISCONTINUED | OUTPATIENT
Start: 2022-09-13 | End: 2022-09-14 | Stop reason: HOSPADM

## 2022-09-13 RX ORDER — EPHEDRINE SULFATE/0.9% NACL/PF 50 MG/5 ML
10 SYRINGE (ML) INTRAVENOUS AS NEEDED
Status: DISCONTINUED | OUTPATIENT
Start: 2022-09-13 | End: 2022-09-13 | Stop reason: HOSPADM

## 2022-09-13 RX ORDER — FENTANYL/ROPIVACAINE/NS/PF 2MCG/ML-.1
PLASTIC BAG, INJECTION (ML) EPIDURAL
Status: COMPLETED
Start: 2022-09-13 | End: 2022-09-13

## 2022-09-13 RX ORDER — LANOLIN ALCOHOL/MO/W.PET/CERES
3 CREAM (GRAM) TOPICAL
Status: DISCONTINUED | OUTPATIENT
Start: 2022-09-13 | End: 2022-09-14 | Stop reason: HOSPADM

## 2022-09-13 RX ORDER — METOCLOPRAMIDE HYDROCHLORIDE 5 MG/ML
10 INJECTION INTRAMUSCULAR; INTRAVENOUS
Status: DISCONTINUED | OUTPATIENT
Start: 2022-09-13 | End: 2022-09-13 | Stop reason: HOSPADM

## 2022-09-13 RX ORDER — PHENYLEPHRINE HCL IN 0.9% NACL 1 MG/10 ML
100 SYRINGE (ML) INTRAVENOUS AS NEEDED
Status: DISCONTINUED | OUTPATIENT
Start: 2022-09-13 | End: 2022-09-13 | Stop reason: HOSPADM

## 2022-09-13 RX ORDER — SODIUM CHLORIDE 0.9 % (FLUSH) 0.9 %
5-40 SYRINGE (ML) INJECTION AS NEEDED
Status: DISCONTINUED | OUTPATIENT
Start: 2022-09-13 | End: 2022-09-13 | Stop reason: HOSPADM

## 2022-09-13 RX ORDER — FENTANYL CITRATE 50 UG/ML
INJECTION, SOLUTION INTRAMUSCULAR; INTRAVENOUS
Status: DISCONTINUED
Start: 2022-09-13 | End: 2022-09-13 | Stop reason: WASHOUT

## 2022-09-13 RX ORDER — MINERAL OIL
OIL (ML) ORAL
Status: COMPLETED
Start: 2022-09-13 | End: 2022-09-13

## 2022-09-13 RX ORDER — FENTANYL/ROPIVACAINE/NS/PF 2MCG/ML-.1
1-15 PLASTIC BAG, INJECTION (ML) EPIDURAL
Status: DISCONTINUED | OUTPATIENT
Start: 2022-09-13 | End: 2022-09-13 | Stop reason: HOSPADM

## 2022-09-13 RX ORDER — NALOXONE HYDROCHLORIDE 0.4 MG/ML
0.2 INJECTION, SOLUTION INTRAMUSCULAR; INTRAVENOUS; SUBCUTANEOUS AS NEEDED
Status: DISCONTINUED | OUTPATIENT
Start: 2022-09-13 | End: 2022-09-13 | Stop reason: HOSPADM

## 2022-09-13 RX ADMIN — LIDOCAINE HYDROCHLORIDE 20 ML: 10 INJECTION, SOLUTION EPIDURAL; INFILTRATION; INTRACAUDAL; PERINEURAL at 05:40

## 2022-09-13 RX ADMIN — ROPIVACAINE HYDROCHLORIDE 12 ML/HR: 5 INJECTION, SOLUTION EPIDURAL; INFILTRATION; PERINEURAL at 01:12

## 2022-09-13 RX ADMIN — MINERAL OIL 30 ML: 1000 SOLUTION ORAL at 05:14

## 2022-09-13 RX ADMIN — ACETAMINOPHEN 650 MG: 325 TABLET ORAL at 12:54

## 2022-09-13 RX ADMIN — Medication 87.3 MILLI-UNITS/MIN: at 05:14

## 2022-09-13 RX ADMIN — Medication 12 ML/HR: at 01:12

## 2022-09-13 RX ADMIN — Medication 30 ML: at 05:14

## 2022-09-13 RX ADMIN — LIDOCAINE HYDROCHLORIDE,EPINEPHRINE BITARTRATE 4.5 ML: 15; .005 INJECTION, SOLUTION EPIDURAL; INFILTRATION; INTRACAUDAL; PERINEURAL at 01:20

## 2022-09-13 NOTE — H&P
Obstetrical History and Physical    Subjective:     Date of Admission: 2022    Patient is a 28 y.o.   female admitted with pregnancy at 40+3 for SROM, meconium stained fluid. Her pregnancy has been uncomplicated. She is GBS negative. For Obstetric history, see prenantal.    For Review of Systems, see prenatal    Past Medical History:   Diagnosis Date    Abnormal Papanicolaou smear of cervix     Adverse effect of anesthesia     sensitve to medication    GERD (gastroesophageal reflux disease)     Ill-defined condition     gallbladder    Uterine fibroid       Past Surgical History:   Procedure Laterality Date    HX ENDOSCOPY      HX WISDOM TEETH EXTRACTION      with local anesthesia      Prior to Admission medications    Medication Sig Start Date End Date Taking? Authorizing Provider   PNV Comb #2-Iron-FA-Omega 3 29-1-400 mg cmpk Take  by mouth. Yes Provider, Historical     No Known Allergies   Social History     Tobacco Use    Smoking status: Never    Smokeless tobacco: Never   Substance Use Topics    Alcohol use: Not Currently      Family History   Problem Relation Age of Onset    Cancer Maternal Grandmother         lung    Cancer Paternal Grandmother     Breast Cancer Maternal Aunt 64        stage 0        Objective:   Blood pressure (!) 142/87, pulse 99, temperature 98.3 °F (36.8 °C), resp. rate 16, height 5' 6\" (1.676 m), weight 73 kg (161 lb), SpO2 100 %. Temp (24hrs), Av.3 °F (36.8 °C), Min:98.3 °F (36.8 °C), Max:98.3 °F (36.8 °C)    No intake/output data recorded. No intake/output data recorded. HEENT: No pallor, no jaundice, Thyroid and throat normal  RESPIRATORY: Clear to A & P  CVS: pulse reg, HS normal  ABDOMEN: Gravid. Vertex. EFW=7lb +-1lb. No abnormal tenderness. Pelvic: Cervix 4,   Effaced: 80%  Station:  -1  Data Review:   No results found for this or any previous visit (from the past 24 hour(s)).   Monitor:    Baseline: 145 bpm    Variability: moderate    Accelerations: +    Decelerations: absent    Ctx: 4-8 minutes lasting 60-80 seconds    Category: I    Assessment:     Principal Problem:    IUP (intrauterine pregnancy), incidental (2022)    Active Problems:    Subserous leiomyoma of uterus (2018)      Overview: 18 7x6 mm.      19 12x11 mm. 40 weeks gestation of pregnancy (2022)      Uterine contractions (2022)      Plan:   Admit to L&D  Anticipate     Check labs:  CBC    Type of admit:In-Patient    I saw and examined patient. Dr. Hahn Labs aware of admission and plan of care.     Signed By: Zander Kumar CNM                         2022

## 2022-09-13 NOTE — PROGRESS NOTES
Pt arrived ambulatory from home with complaint of SROM at  with green fluid. Pt is a  with negative hx at 40.3weeks gestation.  SVE /0 large amount of greenish yellow fluid noted Nitrazine positive.  Georgina Walker CNM notified of pt arrival cervical status 0 with ctx every 4 minutes meconium stained SROM at 1930. Orders given to admit pt for labor at this time.  20G started in RFA x1 stick, labs drawn and sent with IV start.  Pt ambulated to BR at this time .    2145 Pt back to bed .    2301 Pt up to BR at this time .    2308 Pt back to bed .    2354 Pt given birthing ball to aid discomfort at this time. .    0026 Orders received from [address] [place] [address] to allow pt intermittent monitoring. Pt off fetal monitors to walk for an hour for comfort at this time. IV saline locked. .    0100 Pt requesting epidural placement at this time. Cheri Stevens CRNA called to evaluate pt for epidural placement at this time. [address] at bedside for epidural evaluation at this time. 0114 Time out completed at this time. Pt sitting up at bedside with  and RN in front of pt for comfort and support. Pt verbalizes understanding and agreement to epidural placement at this time. Pt verified by armband with name and . 0120 Test dose performed by Cheri Stevens CRNA at this time. 0130 Pt readjusted to R tilt at this time for comfort and bolus button pushed for pt pain relief at this time. Dermatome level L2.    0140 SVE performed at this time 8/90/0. 16fr Lerner placed in urethra. Immediate output of clear yellow urine. Pt tolerated well. Pericare performed at this time. Pt readjusted to L lateral at this time. 0320 Pt called out stating she was feeling pressure SVE 9.5/100/+1. Pt readjusted to R lateral at this time and told to call when she feels constant pressure. Pt agrees to POC at this time.     0340 Lerner removed at this time 400cc of clear yellow urine noted at this time. SVE 10/100/+2. Pt readjusted to lithotomy position to begin pushing at this time. 0344 Pt instructed on pushing at this time and pushing with ctx.    [address] CNM notified of pt complete dilation and orders given to begin pushing at this time. [address] called for delivery. [address] CNM at bedside for delivery at this time. 0505  of viable vigorous infant male and placed skin to skin on mothers chest bulb suctioned and stimulated and remains skin to skin at this time. Delayed cord clamping x4 minutes at this time. Cord clamped and cut and infant remains skin to skin at this time. 0515 Placenta delivered intact. Pitocin to PP rate at this time. 0700 Epidural removed at this time black tip intact. 0716 Pt ambulated to BR at this time to void. Pt voided 500cc of clear yellow urine. Pt tolerated well. Pericare performed. Pt tolerated well. 0730 SBAR report given to [facility] and transfer of care given at this time.

## 2022-09-13 NOTE — PROGRESS NOTES
0840 TRANSFER - IN REPORT:    Verbal report received from [address] [place] [address] RN(name) on [address] [facility]  being received from L&D (unit) for routine progression of care      Report consisted of patients Situation, Background, Assessment and   Recommendations(SBAR). Information from the following report(s) SBAR, Kardex, Procedure Summary, Intake/Output, MAR, and Recent Results was reviewed with the receiving nurse. Opportunity for questions and clarification was provided. 46 educated patient on infant fall prevention and safety, shift assessment complete and vital signs obtained. Patient has no needs or concerns at this time    [id number] patient attempting to nurse     9436 assisted patient with latching      1 patient has no needs or concerns at this time, would like to rest    1254 pain med given per pt request    1455 reassessment complete    1635 patient has no needs or concerns at this time, would like to rest    1920 Bedside and Verbal shift change report given to Kamron Reynoso RN and LIVIA Dale RN  (oncoming nurse) by Vlad Tay RN (offgoing nurse). Report included the following information SBAR, Kardex, Procedure Summary, Intake/Output, MAR, and Recent Results.

## 2022-09-13 NOTE — ANESTHESIA PROCEDURE NOTES
Epidural Block    Patient location during procedure: OB  Start time: 9/13/2022 1:14 AM  End time: 9/13/2022 1:19 AM  Reason for block: labor epidural  Staffing  Performed: CRNA   Resident/CRNA: Siva Travis CRNA  Preanesthetic Checklist  Completed: patient identified, IV checked, site marked, risks and benefits discussed, surgical consent, monitors and equipment checked, pre-op evaluation, timeout performed and fire risk safety assessment completed and verbalized  Block Placement  Patient position: sitting  Prep: ChloraPrep  Sterility prep: mask, hand, gloves, drape and cap  Sedation level: no sedation  Patient monitoring: heart rate, frequent blood pressure checks and continuous pulse oximetry  Approach: midline  Location: lumbar  Lumbar location: L3-L4  Epidural  Loss of resistance technique: saline  Guidance: landmark technique  Needle  Needle type: Tuohy   Needle length: 9 cm  Needle insertion depth: 7 cm  Catheter type: end hole  Catheter size: 19 G  Catheter at skin depth: 12 cm  Catheter securement method: surgical tape, stabilization device and clear occlusive dressing  Test dose: negative  Assessment  Sensory level: T6  Block outcome: pain improved  Number of attempts: 1  Procedure assessment: patient tolerated procedure well with no immediate complications

## 2022-09-13 NOTE — L&D DELIVERY NOTE
Delivery Summary    Patient: Alex Solis MRN: [id number]  SSN: [id number]    YOB: 1990  Age: 28 y.o. Sex: female       Information for the patient's :  Simon Leon [[id number]]     Labor Events:    Labor: No    Steroids:     Cervical Ripening Date/Time:       Cervical Ripening Type: None   Antibiotics During Labor: No   Rupture Identifier:      Rupture Date/Time: 2022 7:30 PM   Rupture Type: SROM   Amniotic Fluid Volume: Moderate    Amniotic Fluid Description: Meconium    Amniotic Fluid Odor: None    Induction: None       Induction Date/Time:        Indications for Induction:      Augmentation: None   Augmentation Date/Time:      Indications for Augmentation:     Labor complications: None       Additional complications:        Delivery Events:  Indications For Episiotomy:     Episiotomy: None   Perineal Laceration(s): 2nd   Repaired:     Periurethral Laceration Location:      Repaired:     Labial Laceration Location: right   Repaired: Yes   Sulcal Laceration Location:     Repaired:     Vaginal Laceration Location:     Repaired:     Cervical Laceration Location:     Repaired:     Repair Suture: Vicryl 3-0   Number of Repair Packets: 2   Estimated Blood Loss (ml): 250ml   Quantitative Blood Loss (ml)                Delivery Date: 2022    Delivery Time: 5:05 AM  Delivery Type: Vaginal, Spontaneous  Sex:  Male    Gestational Age: 36w2d   Delivery Clinician:  Kishore Kat  Living Status: Living   Delivery Location: L&D            APGARS  One minute Five minutes Ten minutes   Skin color: 0   1        Heart rate: 2   2        Grimace: 2   2        Muscle tone: 2   2        Breathin   2        Totals: 8   9            Presentation: Vertex    Position: Left Occiput Anterior  Resuscitation Method:  Suctioning-bulb; Tactile Stimulation     Meconium Stained:  Thin      Cord Information: 3 Vessels  Complications: Nuchal Cord Without Compressions  Cord around: head  Delayed cord clamping? Yes  Cord clamped date/time:2022  5:09 AM  Disposition of Cord Blood: Lab    Blood Gases Sent?: No    Placenta:  Date/Time: 2022  5:15 AM  Removal: Spontaneous      Appearance: Normal      Measurements:  Birth Weight:        Birth Length:        Head Circumference:        Chest Circumference:       Abdominal Girth: Other Providers:   Janis LUU;MARCO A FLOOD, Primary Nurse;Nicu Nurse       Group B Strep: No results found for: GRBSEXT, GRBSEXT  Information for the patient's :  Bell Cid [[id number]]   No results found for: ABORH, PCTABR, PCTDIG, BILI, ABORHEXT, ABORH   No results for input(s): PCO2CB, PO2CB, HCO3I, SO2I, IBD, PTEMPI, SPECTI, PHICB, ISITE, IDEV, IALLEN in the last 72 hours. Vaginal Delivery Procedure Note    Name: Niels Kori   Medical Record Number: [id number]   YOB: 1990  Today's Date: 2022        Procedure: VAGINAL DELIVERY without suction or forceps     Anesthesia:  epidural    Extra Procedure Details:       Estimated Blood Loss: 250    Fetal Description: ballesteros male     Anterior shoulder: left    Umbilical Cord: Nuchal Cord x  1, 3 vessels present, and Cord blood sent to lab for type, Rh, and Unique' test    Episiotomy: no   Tear: yes  Type: perineal  Degree: 2nd degree   Repair: 3-0 vicryl CT1, 4-0 vicryl SH             Cord Blood Results:   Information for the patient's :  Bell Cid [[id number]]   @AB@     Birth Information:   Information for the patient's :  Bell Cid [[id number]]         Apgars: 8,9 at 1 and 5 min respectively    Specimens: Placenta was not sent     Placenta: Spontaneous with assist and apparently intact on exam          Complications:  none     Birth Weight: see baby part of chart    Mother's Condition: good  Baby's Condition: stable  Sponge and needle count: correct    I was present for the delivery.  Delivered for our practice.     Signed: Stanley Connelly CNM      September 13, 2022

## 2022-09-13 NOTE — PROGRESS NOTES
Problem: Vaginal Delivery: Day of Delivery-Post delivery  Goal: Activity/Safety  Outcome: Progressing Towards Goal  Goal: Nutrition/Diet  Outcome: Progressing Towards Goal  Goal: Discharge Planning  Outcome: Progressing Towards Goal  Goal: Medications  Outcome: Progressing Towards Goal  Goal: Treatments/Interventions/Procedures  Outcome: Progressing Towards Goal  Goal: *Vital signs within defined limits  Outcome: Progressing Towards Goal  Goal: *Labs within defined limits  Outcome: Progressing Towards Goal  Goal: *Hemodynamically stable  Outcome: Progressing Towards Goal  Goal: *Optimal pain control at patient's stated goal  Outcome: Progressing Towards Goal  Goal: *Participates in infant care  Outcome: Progressing Towards Goal  Goal: *Demonstrates progressive activity  Outcome: Progressing Towards Goal  Goal: *Tolerating diet  Outcome: Progressing Towards Goal     Problem: Pain  Goal: *Control of Pain  Outcome: Progressing Towards Goal     Problem: Patient Education: Go to Patient Education Activity  Goal: Patient/Family Education  Outcome: Progressing Towards Goal

## 2022-09-13 NOTE — PROGRESS NOTES
1925 Bedside shift change report given to Calli Petersen RN (oncoming nurse) by Jah Buchanan RN (offgoing nurse). Report included the following information SBAR, Intake/Output, MAR, and Recent Results. 2123 Pt attempting to feed infant. 0 Pt. joined at bedside by support person and baby. AAOx4. Pain 0/10. Fundus firm at U-1, scant rubra lochia. No clots noted. Educated on signs and symptoms to report and plan of care. No further questions or concerns at this time. Callbell within reach. Bed in lowest position. 0105 Pt feeding infant. 0340 Pt resting.    0725 Bedside shift change report given to Bhakti Kwok RN (oncoming nurse) by Calli Petersen RN (offgoing nurse). Report included the following information SBAR, Intake/Output, MAR, and Recent Results.

## 2022-09-13 NOTE — ANESTHESIA PREPROCEDURE EVALUATION
Relevant Problems   No relevant active problems       Anesthetic History   No history of anesthetic complications            Review of Systems / Medical History  Patient summary reviewed, nursing notes reviewed and pertinent labs reviewed    Pulmonary  Within defined limits                 Neuro/Psych   Within defined limits           Cardiovascular  Within defined limits                     GI/Hepatic/Renal     GERD: well controlled           Endo/Other  Within defined limits           Other Findings              Physical Exam    Airway  Mallampati: II  TM Distance: 4 - 6 cm  Neck ROM: normal range of motion   Mouth opening: Normal     Cardiovascular  Regular rate and rhythm,  S1 and S2 normal,  no murmur, click, rub, or gallop             Dental  No notable dental hx       Pulmonary                 Abdominal  GI exam deferred       Other Findings            Anesthetic Plan    ASA: 1  Anesthesia type: epidural            Anesthetic plan and risks discussed with: Patient and Father      Risks reviewed with pt and spouse.

## 2022-09-14 VITALS
WEIGHT: 161 LBS | TEMPERATURE: 98.5 F | DIASTOLIC BLOOD PRESSURE: 74 MMHG | SYSTOLIC BLOOD PRESSURE: 127 MMHG | HEART RATE: 97 BPM | HEIGHT: 66 IN | RESPIRATION RATE: 20 BRPM | BODY MASS INDEX: 25.88 KG/M2 | OXYGEN SATURATION: 100 %

## 2022-09-14 LAB
HCT VFR BLD AUTO: 33.5 % (ref 35–45)
HGB BLD-MCNC: 10.9 G/DL (ref 12–16)

## 2022-09-14 PROCEDURE — 74011250637 HC RX REV CODE- 250/637: Performed by: ADVANCED PRACTICE MIDWIFE

## 2022-09-14 PROCEDURE — 36415 COLL VENOUS BLD VENIPUNCTURE: CPT

## 2022-09-14 PROCEDURE — 85018 HEMOGLOBIN: CPT

## 2022-09-14 RX ORDER — IBUPROFEN 800 MG/1
800 TABLET ORAL
Qty: 90 TABLET | Refills: 0 | Status: SHIPPED | OUTPATIENT
Start: 2022-09-14

## 2022-09-14 RX ADMIN — ACETAMINOPHEN 650 MG: 325 TABLET ORAL at 11:35

## 2022-09-14 NOTE — PROGRESS NOTES
0725- Bedside and Verbal shift change report given to Rodo (oncoming nurse) by Ab Ireland and Zach (offgoing nurse). Report included the following information SBAR, Procedure Summary, Intake/Output, MAR, and Recent Results. 2215- shift assessment complete. Pt stated she took her own home tylenol at 0600. Pt educated to not do this as we need to document medication given and pt verbalized understanding    1100- pt resting in bed    1130- Circumcision care reviewed with parents. Parents instructed to call for bleeding greater than the size of a quarter, blood soaking in the diaper, to use vaseline with every diaper change, and not to use wipes on tip of penis. Parents verbalized and acknowledged understanding, questions answered, no other concerns at this time. Pt medicated per MAR. I have reviewed discharge instructions with the patient. The patient verbalized understanding. 1315- AVS given and reviewed. Bands verified.  Patient armband removed and shredded    1447- pt discharged home with infant in stable condition

## 2022-09-14 NOTE — PROGRESS NOTES
0840- while obtaining vs and pain assessment, pt stated that she \"has been taking 500mg tylenol from home every 6 hours. \" Educated pt to \"refrain from taking medication from home and to vocalize pain scale and intervention needs to RN because we need to know what and when she is taking any medication in case of emergency. \" Verbal report and update given to RN Thu Butler who said she will further educate pt.

## 2022-09-14 NOTE — PROGRESS NOTES
Problem: Vaginal Delivery: Postpartum Day 1  Goal: Activity/Safety  Outcome: Progressing Towards Goal  Goal: Consults, if ordered  Outcome: Progressing Towards Goal  Goal: Diagnostic Test/Procedures  Outcome: Progressing Towards Goal  Goal: Discharge Planning  Outcome: Progressing Towards Goal  Goal: Medications  Outcome: Progressing Towards Goal  Goal: Treatments/Interventions/Procedures  Outcome: Progressing Towards Goal  Goal: Psychosocial  Outcome: Progressing Towards Goal  Goal: *Vital signs within defined limits  Outcome: Progressing Towards Goal  Goal: *Labs within defined limits  Outcome: Progressing Towards Goal  Goal: *Hemodynamically stable  Outcome: Progressing Towards Goal  Goal: *Optimal pain control at patient's stated goal  Outcome: Progressing Towards Goal  Goal: *Participates in infant care  Outcome: Progressing Towards Goal  Goal: *Demonstrates progressive activity  Outcome: Progressing Towards Goal  Goal: *Appropriate parent-infant bonding  Outcome: Progressing Towards Goal  Goal: *Performs self breast care  Outcome: Progressing Towards Goal

## 2022-09-14 NOTE — DISCHARGE SUMMARY
Obstetrical Discharge Summary     Name: Rommel Feng MRN: [id number]  SSN: [id number]    YOB: 1990  Age: 28 y.o. Sex: female      Admit Date: 2022    Discharge Date: 2022     Admitting Physician: Roque Jerry MD     Attending Physician:  Emma Ndiaye MD     Admission Diagnoses: IUP (intrauterine pregnancy), incidental [Z33.1]  40 weeks gestation of pregnancy [Z3A.40]    Discharge Diagnoses:   Information for the patient's :  Norita Ormond [[id number]]   Delivery of a 3.61 kg male infant via Vaginal, Spontaneous on 2022 at 5:05 AM  by Jyothi Caldwell. Apgars were 8  and 9 . Additional Diagnoses:   Hospital Problems  Date Reviewed: 2022            Codes Class Noted POA     (spontaneous vaginal delivery) ICD-10-CM: O80  ICD-9-CM: 650  2022 Unknown        * (Principal) IUP (intrauterine pregnancy), incidental ICD-10-CM: Z33.1  ICD-9-CM: V22.2  2022 Yes        40 weeks gestation of pregnancy ICD-10-CM: Z3A.40  ICD-9-CM: V22.2  2022 Yes        Uterine contractions ICD-10-CM: O47.9  ICD-9-CM: 644.10  2022 Yes        Subserous leiomyoma of uterus (Chronic) ICD-10-CM: D25.2  ICD-9-CM: 218.2  2018 Yes    Overview Addendum 2019 11:33 AM by Eveline Wood MD     18 7x6 mm.  19 12x11 mm. No results found for: RUBELLAEXT, GRBSEXT    Immunization(s): There is no immunization history for the selected administration types on file for this patient. Hospital Course: Normal hospital course following the delivery. Patient Instructions:   Current Discharge Medication List        START taking these medications    Details   ibuprofen (MOTRIN) 800 mg tablet Take 1 Tablet by mouth every eight (8) hours as needed for Pain. Qty: 90 Tablet, Refills: 0           CONTINUE these medications which have NOT CHANGED    Details   PNV Comb #2-Iron-FA-Omega 3 29-1-400 mg cmpk Take  by mouth.              Reference my discharge instructions. Follow-up Appointments   Procedures    FOLLOW UP VISIT Appointment in: 6 Weeks Please call OB office to schedule 6 week postpartum follow up appointment. Please call OB office to schedule 6 week postpartum follow up appointment.      Standing Status:   Standing     Number of Occurrences:   1     Order Specific Question:   Appointment in     Answer:   6 Weeks        Signed By:  Tree De La Fuente CNM     September 14, 2022

## 2022-09-14 NOTE — LACTATION NOTE
This note was copied from a baby's chart. 2017 mom in the bathroom. Will return.  Mom educated on breastfeeding basics--hunger cues, feeding on demand, waking baby if baby sleeps too long between feeds, importance of skin to skin, positioning and latching, risk of pacifier use and supplemental feedings, and importance of rooming in--and use of log sheet. Mom also educated on benefits of breastfeeding for herself and baby. Mom verbalized understanding. No questions at this time. Mom has  latched at this time. Normal DOL behaviors were discussed. Educated mom on both the football and cross cradle position. Breastfeeding discharge teaching completed to include feeding on demand, foremilk and hindmilk importance, engorgement, mastitis, clogged ducts, pumping, breastmilk storage, and returning to work. Information given about unit and office phone numbers and encouraged mom to reach out if concerns arise. Mom verbalized understanding and no questions at this time.

## 2022-09-14 NOTE — PROGRESS NOTES
Progress Note    Patient: Louise Gale MRN: [id number]  SSN: [id number]    YOB: 1990  Age: 28 y.o. Sex: female      Subjective:     Postpartum Day: 1 Vaginal Delivery    The patient is without complaints. The patient is ambulating well. The patient is tolerating a normal diet. The baby is well. Objective:      Patient Vitals for the past 8 hrs:   BP Temp Pulse Resp SpO2   09/14/22 0840 127/74 98.5 °F (36.9 °C) 97 20 100 %     LABS: Recent Results (from the past 24 hour(s))   HGB & HCT    Collection Time: 09/14/22  5:11 AM   Result Value Ref Range    HGB 10.9 (L) 12.0 - 16.0 g/dL    HCT 33.5 (L) 35.0 - 45.0 %       Lab Results   Component Value Date/Time    HGB 10.9 (L) 09/14/2022 05:11 AM     Lab Results   Component Value Date/Time    HCT 33.5 (L) 09/14/2022 05:11 AM      Lochia:  appropriate   Uterine Fundus:   firm, -2     Lab/Data Review: All lab results for the last 24 hours reviewed. Assessment:     Status post: Doing well postpartum vaginal delivery day 1. Plan:     Continue day 1 post-vaginal delivery orders. Postpartum care discussed including diet, ambulation, and actvitiy restrictions. Pt would like to go home today. Reviewed dc instructions, follow up care and answered all questions.      Signed By: Tu Benz CNM     September 14, 2022

## 2022-09-14 NOTE — PROGRESS NOTES
2305: Bedside shift change report given to Anastacio Manzano RN (oncoming nurse) by Harley Dong RN (offgoing nurse). Report included the following information SBAR, Intake/Output, MAR, and Recent Results. 0020: Patient informed about infant 24hr screens and taught swaddling methods. 0510: Patient BF infant. 0725: Bedside shift change report given to Meir Calderon (oncoming nurse) by Harley Dong RN/CHATO Gottlieb RN (offgoing nurse). Report included the following information SBAR, Intake/Output, MAR, and Recent Results.

## 2022-09-14 NOTE — DISCHARGE INSTRUCTIONS
POST DELIVERY DISCHARGE INSTRUCTIONS    Name: Lizzette Davila  YOB: 1990  Primary Diagnosis: Principal Problem:    IUP (intrauterine pregnancy), incidental (2022)    Active Problems:    Subserous leiomyoma of uterus (2018)      Overview: 18 7x6 mm.      19 12x11 mm. 40 weeks gestation of pregnancy (2022)      Uterine contractions (2022)       (spontaneous vaginal delivery) (2022)        General:     Diet/Diet Restrictions:  Eight 8-ounce glasses of fluid daily (water, juices); avoid excessive caffeine intake. Meals/snacks as desired which are high in fiber and carbohydrates and low in fat and cholesterol. Physical Activity / Restrictions / Safety:     Avoid heavy lifting, no more that 8 lbs. For 2-3 weeks; limit use of stairs to 2 times daily for the first week home. No driving for one week. Avoid intercourse 4-6 weeks, no douching or tampon use. Check with obstetrician before starting or resuming an exercise program.         Discharge Instructions/Special Treatment/Home Care Needs:     Continue prenatal vitamins. Continue to use squirt bottle with warm water on your episiotomy after each bathroom use until bleeding stops. If steri-strips applied to your incision, remove in 7-10 days. Call your doctor for the following:     Fever over 101 degrees by mouth. Vaginal bleeding heavier than a normal menstrual period or clot larger than a golf ball. Red streaks or increased swelling of legs, painful red streaks on your breast.  Painful urination, constipation and increased pain or swelling or discharge with your incision. If you feel extremely anxious or overwhelmed. If you have thoughts of harming yourself and/or your baby. Pain Management:     Pain Management:   Take Acetaminophen (Tylenol) or Ibuprofen (Advil, Motrin), as directed for pain. Use a warm Sitz bath 3 times daily to relieve episiotomy or hemorrhoidal discomfort.  Heating pad to  incision as needed. For hemorrhoidal discomfort, use Tucks and Anusol cream as needed and directed. Follow-Up Care: These are general instructions for a healthy lifestyle:    No smoking/ No tobacco products/ Avoid exposure to second hand smoke    Surgeon General's Warning:  Quitting smoking now greatly reduces serious risk to your health. Obesity, smoking, and sedentary lifestyle greatly increases your risk for illness    A healthy diet, regular physical exercise & weight monitoring are important for maintaining a healthy lifestyle    Recognize signs and symptoms of STROKE:    F-face looks uneven    A-arms unable to move or move unevenly    S-speech slurred or non-existent    T-time-call 911 as soon as signs and symptoms begin-DO NOT go       Back to bed or wait to see if you get better-TIME IS BRAIN. Depression After Childbirth: Care Instructions  Overview     Many women get the \"baby blues\" during the first few days after childbirth. You may lose sleep, feel irritable, and cry easily. You may feel happy one minute and sad the next. Hormone changes are one cause of these emotional changes. Also, the demands of a new baby, along with visits from relatives or other family needs, can add to the stress. The \"baby blues\" often peak around the fourth day. Then they ease up in less than 2 weeks. If your moodiness or anxiety lasts for more than 2 weeks, or if you feel like life is not worth living, you may have postpartum depression. This is different for each person. Some mothers with serious depression may worry intensely about their infant's well-being. Others may feel distant from their child. Some mothers may even feel that they might harm their baby. Some may have signs of paranoia, wondering if someone is watching them. Depression is not a sign of weakness. It's a medical condition that requires treatment. Medicine and counseling often work well to reduce depression.  Talk to your doctor about taking antidepressant medicine while breastfeeding. Follow-up care is a key part of your treatment and safety. Be sure to make and go to all appointments, and call your doctor if you are having problems. It's also a good idea to know your test results and keep a list of the medicines you take. How do you know if you are depressed? With all the changes in your life, you may not know if you are depressed. Pregnancy sometimes causes changes in how you feel that are similar to the symptoms of depression. Symptoms of depression include:  Feeling sad or hopeless and losing interest in daily activities. These are the most common symptoms of depression. Sleeping too much or not enough. Feeling tired. You may feel as if you have no energy. Eating too much or too little. Writing or talking about death, such as writing suicide notes or talking about guns, knives, or pills. Keep the numbers for these national suicide hotlines: 5-310-077-TALK (5-787.274.5679) and 7-024-YFBFTGN (0-541.799.7471). If you or someone you know talks about suicide or feeling hopeless, get help right away. How can you care for yourself at home? Be safe with medicines. Take your medicines exactly as prescribed. Call your doctor if you think you are having a problem with your medicine. Eat a healthy diet so that you can keep up your energy. Get regular daily exercise, such as walks, to help improve your mood. Get as much sunlight as possible. Keep your shades and curtains open. Get outside as much as you can. Avoid using alcohol or other substances to feel better. Get as much rest and sleep as possible. Avoid doing too much. Being too tired can increase depression. Play stimulating music throughout your day and soothing music at night. Schedule outings and visits with friends and family. Ask them to call you regularly, so that you don't feel alone. Ask for help with preparing food and other daily tasks.  Family and friends are often happy to help with a . Be honest with yourself and those who care about you. Tell them about your struggle. Join a support group of new mothers. No one can better understand the challenges of caring for a  than other new mothers. If you feel like life is not worth living or you're feeling hopeless, get help right away. Keep the numbers for these national suicide hotlines: 7-572-099-TALK (1-495-862-417.856.7370) and 8-887-UNEEJFS (4-753.932.8965). When should you call for help? Call 911 anytime you think you may need emergency care. For example, call if:    You feel you cannot stop from hurting yourself, your baby, or someone else. Call your doctor now or seek immediate medical care if:    You are having trouble caring for yourself or your baby. You hear voices. Watch closely for changes in your health, and be sure to contact your doctor if:    You have problems with your depression medicine. You do not get better as expected. Where can you learn more? Go to http://www.gray.com/  Enter [id number] in the search box to learn more about \"Depression After Childbirth: Care Instructions. \"  Current as of: 2021               Content Version: 13.2  © 7712-3260 [employer]. Care instructions adapted under license by [employer] (which disclaims liability or warranty for this information). If you have questions about a medical condition or this instruction, always ask your healthcare professional. [address] any warranty or liability for your use of this information.

## 2022-09-14 NOTE — PROGRESS NOTES
Problem: Vaginal Delivery: Postpartum Day 1  Goal: Activity/Safety  Outcome: Progressing Towards Goal  Goal: Diagnostic Test/Procedures  Outcome: Progressing Towards Goal  Goal: Discharge Planning  Outcome: Progressing Towards Goal  Goal: Medications  Outcome: Progressing Towards Goal  Goal: Treatments/Interventions/Procedures  Outcome: Progressing Towards Goal  Goal: Psychosocial  Outcome: Progressing Towards Goal  Goal: *Vital signs within defined limits  Outcome: Progressing Towards Goal  Goal: *Labs within defined limits  Outcome: Progressing Towards Goal  Goal: *Hemodynamically stable  Outcome: Progressing Towards Goal  Goal: *Optimal pain control at patient's stated goal  Outcome: Progressing Towards Goal  Goal: *Participates in infant care  Outcome: Progressing Towards Goal  Goal: *Demonstrates progressive activity  Outcome: Progressing Towards Goal  Goal: *Appropriate parent-infant bonding  Outcome: Progressing Towards Goal     Problem: Pain  Goal: *Control of Pain  Outcome: Progressing Towards Goal

## (undated) DEVICE — SPONGE GZ W4XL4IN COT 12 PLY TYP VII WVN C FLD DSGN

## (undated) DEVICE — TRAP SPEC COLL POLYP POLYSTYR --

## (undated) DEVICE — Device

## (undated) DEVICE — GARMENT,MEDLINE,DVT,INT,CALF,MED, GEN2: Brand: MEDLINE

## (undated) DEVICE — TUBING, SUCTION, 1/4" X 12', STRAIGHT: Brand: MEDLINE

## (undated) DEVICE — MAJ-1414 SINGLE USE ADPATER BIOPSY VALV: Brand: SINGLE USE ADAPTOR BIOPSY VALVE

## (undated) DEVICE — KENDALL RADIOLUCENT FOAM MONITORING ELECTRODE RECTANGULAR SHAPE: Brand: KENDALL

## (undated) DEVICE — MOUTHPIECE ENDOSCP 20X27MM --

## (undated) DEVICE — REM POLYHESIVE ADULT PATIENT RETURN ELECTRODE: Brand: VALLEYLAB

## (undated) DEVICE — SINGLE PORT MANIFOLD: Brand: NEPTUNE 2